# Patient Record
Sex: FEMALE | Race: WHITE | URBAN - METROPOLITAN AREA
[De-identification: names, ages, dates, MRNs, and addresses within clinical notes are randomized per-mention and may not be internally consistent; named-entity substitution may affect disease eponyms.]

---

## 2017-09-19 ENCOUNTER — OFFICE VISIT (OUTPATIENT)
Dept: FAMILY MEDICINE | Facility: CLINIC | Age: 27
End: 2017-09-19
Payer: COMMERCIAL

## 2017-09-19 VITALS
HEIGHT: 67 IN | DIASTOLIC BLOOD PRESSURE: 60 MMHG | TEMPERATURE: 98.4 F | HEART RATE: 68 BPM | WEIGHT: 146 LBS | BODY MASS INDEX: 22.91 KG/M2 | SYSTOLIC BLOOD PRESSURE: 100 MMHG

## 2017-09-19 DIAGNOSIS — Z11.3 SCREEN FOR STD (SEXUALLY TRANSMITTED DISEASE): ICD-10-CM

## 2017-09-19 DIAGNOSIS — Z30.41 ORAL CONTRACEPTIVE PILL SURVEILLANCE: ICD-10-CM

## 2017-09-19 DIAGNOSIS — R87.610 ATYPICAL SQUAMOUS CELLS OF UNDETERMINED SIGNIFICANCE ON CYTOLOGIC SMEAR OF CERVIX (ASC-US): Primary | ICD-10-CM

## 2017-09-19 PROCEDURE — 99213 OFFICE O/P EST LOW 20 MIN: CPT | Performed by: INTERNAL MEDICINE

## 2017-09-19 PROCEDURE — 87624 HPV HI-RISK TYP POOLED RSLT: CPT | Performed by: INTERNAL MEDICINE

## 2017-09-19 PROCEDURE — 88175 CYTOPATH C/V AUTO FLUID REDO: CPT | Performed by: INTERNAL MEDICINE

## 2017-09-19 RX ORDER — NORETHINDRONE ACETATE AND ETHINYL ESTRADIOL 1.5-30(21)
1 KIT ORAL DAILY
Qty: 84 TABLET | Refills: 4 | Status: SHIPPED | OUTPATIENT
Start: 2017-09-19 | End: 2018-10-23

## 2017-09-19 NOTE — NURSING NOTE
"Chief Complaint   Patient presents with     Recheck Medication     birth control        Initial /60 (BP Location: Left arm, Patient Position: Chair, Cuff Size: Adult Regular)  Pulse 68  Temp 98.4  F (36.9  C) (Tympanic)  Ht 5' 7\" (1.702 m)  Wt 146 lb (66.2 kg)  LMP 09/05/2017  BMI 22.87 kg/m2 Estimated body mass index is 22.87 kg/(m^2) as calculated from the following:    Height as of this encounter: 5' 7\" (1.702 m).    Weight as of this encounter: 146 lb (66.2 kg).  Medication Reconciliation: complete  "

## 2017-09-19 NOTE — MR AVS SNAPSHOT
"              After Visit Summary   2017    Ashley Bill    MRN: [id number]           Patient Information     Date Of Birth          1990        Visit Information        Provider Department      2017 9:20 AM Glenis Hunt MD [facility]        Today's Diagnoses     Atypical squamous cells of undetermined significance on cytologic smear of cervix (ASC-US)    -  1    Oral contraceptive pill surveillance           Follow-ups after your visit        Who to contact     If you have questions or need follow up information about today's clinic visit or your schedule please contact [facility] directly at [phone].  Normal or non-critical lab and imaging results will be communicated to you by MyChart, letter or phone within 4 business days after the clinic has received the results. If you do not hear from us within 7 days, please contact the clinic through MyChart or phone. If you have a critical or abnormal lab result, we will notify you by phone as soon as possible.  Submit refill requests through Mlog or call your pharmacy and they will forward the refill request to us. Please allow 3 business days for your refill to be completed.          Additional Information About Your Visit        MyChart Information     Mlog lets you send messages to your doctor, view your test results, renew your prescriptions, schedule appointments and more. To sign up, go to [email or web address] . Click on \"Log in\" on the left side of the screen, which will take you to the Welcome page. Then click on \"Sign up Now\" on the right side of the page.     You will be asked to enter the access code listed below, as well as some personal information. Please follow the directions to create your username and password.     Your access code is: [email or web address]  Expires: 2017  9:44 AM     Your access code will  in 90 days. If you need help or a new code, please call your " "[facility] or [phone].        Care EveryWhere ID     This is your Care EveryWhere ID. This could be used by other organizations to access your [place] medical records  [id number]        Your Vitals Were     Pulse Temperature Height Last Period BMI (Body Mass Index)       68 98.4  F (36.9  C) (Tympanic) 5' 7\" (1.702 m) 09/05/2017 22.87 kg/m2        Blood Pressure from Last 3 Encounters:   09/19/17 100/60   12/26/16 116/78   11/25/15 124/77    Weight from Last 3 Encounters:   09/19/17 146 lb (66.2 kg)   12/26/16 140 lb (63.5 kg)   11/25/15 134 lb (60.8 kg)              We Performed the Following     Pap imaged thin layer diagnostic with HPV (select HPV order below)          Where to get your medicines      These medications were sent to [facility]pharmacy #[id number] [address] in the [address] [address]    Hours:  24-hours Phone:  [phone]     norethindrone-ethinyl estradiol-iron 1.5-30 MG-MCG per tablet          Primary Care Provider Office Phone # Fax #    PÉREZ Elder [phone]       XX RETIRED XX  [address] [place] [address]        Equal Access to Services     DORIS CASTILLO AH: Hadii chritsopher blanchard hadasho Sodominga, waaxda luqadaha, qaybta kaalshara abernathy. So [facility] [phone].    ATENCIÓN: Si habla español, tiene a reyna disposición servicios gratuitos de asistencia lingüística. Levi al [phone].    We comply with applicable federal civil rights laws and [place] laws. We do not discriminate on the basis of race, color, national origin, age, disability sex, sexual orientation or gender identity.            Thank you!     Thank you for choosing [facility]  for your care. Our goal is always to provide you with excellent care. Hearing back from our patients is one way we can continue to improve our services. Please take a few minutes to complete the written survey that you may " receive in the mail after your visit with us. Thank you!             Your Updated Medication List - Protect others around you: Learn how to safely use, store and throw away your medicines at www.disposemymeds.org.          This list is accurate as of: 9/19/17  9:44 AM.  Always use your most recent med list.                   Brand Name Dispense Instructions for use Diagnosis    norethindrone-ethinyl estradiol-iron 1.5-30 MG-MCG per tablet    JUNEL FE 1.5/30    84 tablet    Take 1 tablet by mouth daily    Oral contraceptive pill surveillance

## 2017-09-19 NOTE — LETTER
September 27, 2017      Ashley [place]  [address] [place] [address]    Dear ,      This letter is in regards to the PAP smear and HPV (Human Papillomavirus) test you had done recently. Your PAP test result is normal, but your HPV (Human Papillomavirus) test was positive.     About 80 percent of women have been exposed to HPV virus throughout their lifetime. There is no medication for the treatment of HPV. Typically your own immune system gets rid of the virus before it does harm. HPV is spread by direct skin-to-skin contact, including sexual intercourse, oral sex, anal sex, or any other contact involving the genital area (example: hand to genital contact). It is not possible to become infected with HPV by touching an object, such as a toilet seat. Most people who are infected with HPV have no signs or symptoms.    Things that you can do to boost your immune system and help your body get rid of HPV: get plenty of rest, eat a well-balanced diet of healthy foods, and stop smoking.     Please return in 1 year to repeat your pap smear and HPV test.     If you have additional questions regarding this result, please call [phone].    Sincerely,      Glenis Hunt MD/alejandra

## 2017-09-19 NOTE — PROGRESS NOTES
"  SUBJECTIVE:   Ashley Bill is a 27 year old female who presents to clinic today for the following health issues:      Medication Followup of Junel     Taking Medication as prescribed: yes    Side Effects:  None    Medication Helping Symptoms:  not applicable     Ashley lives in [place] with her fiance, getting  next month. 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  She is home for a little bit so came in for appt to get OCP refilled.  Will not be back in December, time of her last visit.  Has been on the pill for years, works well for her, consistent taking it every day. No abnormal vaginal symptoms.         Problem list and histories reviewed & adjusted, as indicated.      Reviewed and updated as needed this visit by clinical staffTobacco  Allergies  Meds  Soc Hx      Reviewed and updated as needed this visit by Provider         ROS:  Const,  reviewed,  otherwise negative unless noted above.       OBJECTIVE:     /60 (BP Location: Left arm, Patient Position: Chair, Cuff Size: Adult Regular)  Pulse 68  Temp 98.4  F (36.9  C) (Tympanic)  Ht 5' 7\" (1.702 m)  Wt 146 lb (66.2 kg)  LMP 09/05/2017  BMI 22.87 kg/m2  Body mass index is 22.87 kg/(m^2).  GENERAL: healthy, alert and no distress   (female): normal female external genitalia, vaginal mucosa pink, moist, well rugated, mild cervical erythema.       ASSESSMENT/PLAN:       1. Oral contraceptive pill surveillance  - norethindrone-ethinyl estradiol-iron (JUNEL FE 1.5/30) 1.5-30 MG-MCG per tablet; Take 1 tablet by mouth daily  Dispense: 84 tablet; Refill: 4    2. Atypical squamous cells of undetermined significance on cytologic smear of cervix (ASC-US)  Not due for another 3 months, but she doesn't have a doctor in [place] and will not be back in December, so will do today.   - Pap imaged thin layer diagnostic with HPV (select HPV order below)    3. Screen for STD (sexually transmitted disease)  - " Chlamydia trachomatis PCR; Future    F/U as needed for persistent or worsening symptoms.       Glenis Hunt MD  [facility]

## 2017-09-21 LAB
COPATH REPORT: NORMAL
PAP: NORMAL

## 2017-09-25 LAB
FINAL DIAGNOSIS: ABNORMAL
HPV HR 12 DNA CVX QL NAA+PROBE: POSITIVE
HPV16 DNA SPEC QL NAA+PROBE: NEGATIVE
HPV18 DNA SPEC QL NAA+PROBE: NEGATIVE
SPECIMEN DESCRIPTION: ABNORMAL

## 2018-01-29 ENCOUNTER — MYC MEDICAL ADVICE (OUTPATIENT)
Dept: FAMILY MEDICINE | Facility: CLINIC | Age: 28
End: 2018-01-29

## 2018-01-30 NOTE — TELEPHONE ENCOUNTER
Please see patient's MyChart message regarding travel to [place] and OCP   Mychart replied to patient advising consult with travel clinic.  OCP still have refills at [facility] pharmacy in [place] and can be transfer if needed.      Cally Martin RN

## 2018-02-28 DIAGNOSIS — Z30.41 ORAL CONTRACEPTIVE PILL SURVEILLANCE: ICD-10-CM

## 2018-02-28 RX ORDER — NORETHINDRONE ACETATE AND ETHINYL ESTRADIOL 1.5-30(21)
1 KIT ORAL DAILY
Qty: 84 TABLET | Refills: 4 | OUTPATIENT
Start: 2018-02-28

## 2018-02-28 NOTE — TELEPHONE ENCOUNTER
"Requested Prescriptions   Pending Prescriptions Disp Refills     norethindrone-ethinyl estradiol-iron (JUNEL FE 1.5/30) 1.5-30 MG-MCG per tablet  Last Written Prescription Date:  9/19/17  Last Fill Quantity: 84,  # refills: 4   Last office visit: 9/19/2017 with prescribing provider:  Marilee   Future Office Visit:     84 tablet 4     Sig: Take 1 tablet by mouth daily    Contraceptives Protocol Failed    2/28/2018  8:02 AM       Failed - No positive pregnancy test in past 12 months       Passed - Patient is not a current smoker if age is 35 or older       Passed - Recent or future visit with authorizing provider's specialty    Patient had office visit in the last year or has a visit in the next 30 days with authorizing provider.  See \"Patient Info\" tab in inbasket, or \"Choose Columns\" in Meds & Orders section of the refill encounter.            Passed - No active pregnancy on record          "

## 2018-03-12 DIAGNOSIS — Z30.41 ORAL CONTRACEPTIVE PILL SURVEILLANCE: ICD-10-CM

## 2018-03-12 NOTE — TELEPHONE ENCOUNTER
"Requested Prescriptions   Pending Prescriptions Disp Refills     norethindrone-ethinyl estradiol-iron (JUNEL FE 1.5/30) 1.5-30 MG-MCG per tablet  Last Written Prescription Date:  9-  Last Fill Quantity: 84 tablet,  # refills: 4   Last office visit: 9/19/2017 with prescribing provider:  9-   Future Office Visit:     84 tablet 4     Sig: Take 1 tablet by mouth daily    Contraceptives Protocol Failed    3/12/2018  8:56 AM       Failed - Recent (12 mo) or future (30 days) visit within the authorizing provider's specialty    Patient had office visit in the last 12 months or has a visit in the next 30 days with authorizing provider or within the authorizing provider's specialty.  See \"Patient Info\" tab in inbasket, or \"Choose Columns\" in Meds & Orders section of the refill encounter.           Passed - Patient is not a current smoker if age is 35 or older       Passed - No active pregnancy on record       Passed - No positive pregnancy test in past 12 months          "

## 2018-03-13 RX ORDER — NORETHINDRONE ACETATE AND ETHINYL ESTRADIOL 1.5-30(21)
1 KIT ORAL DAILY
Qty: 84 TABLET | Refills: 4 | OUTPATIENT
Start: 2018-03-13

## 2018-10-22 ENCOUNTER — HEALTH MAINTENANCE LETTER (OUTPATIENT)
Age: 28
End: 2018-10-22

## 2019-01-21 ENCOUNTER — TELEPHONE (OUTPATIENT)
Dept: FAMILY MEDICINE | Facility: CLINIC | Age: 29
End: 2019-01-21

## 2019-01-21 NOTE — TELEPHONE ENCOUNTER
Pt is past due for f/u pap smear.  [facility] clinic and schedule.  Monse Cardona,    Pap Tracking

## 2019-11-06 ENCOUNTER — HEALTH MAINTENANCE LETTER (OUTPATIENT)
Age: 29
End: 2019-11-06

## 2020-11-29 ENCOUNTER — HEALTH MAINTENANCE LETTER (OUTPATIENT)
Age: 30
End: 2020-11-29

## 2021-09-19 ENCOUNTER — HEALTH MAINTENANCE LETTER (OUTPATIENT)
Age: 31
End: 2021-09-19

## 2022-01-09 ENCOUNTER — HEALTH MAINTENANCE LETTER (OUTPATIENT)
Age: 32
End: 2022-01-09

## 2022-11-21 ENCOUNTER — HEALTH MAINTENANCE LETTER (OUTPATIENT)
Age: 32
End: 2022-11-21

## 2023-04-16 ENCOUNTER — HEALTH MAINTENANCE LETTER (OUTPATIENT)
Age: 33
End: 2023-04-16